# Patient Record
Sex: FEMALE | Race: WHITE | URBAN - METROPOLITAN AREA
[De-identification: names, ages, dates, MRNs, and addresses within clinical notes are randomized per-mention and may not be internally consistent; named-entity substitution may affect disease eponyms.]

---

## 2017-11-30 ENCOUNTER — OFFICE VISIT (OUTPATIENT)
Dept: FAMILY MEDICINE CLINIC | Age: 27
End: 2017-11-30

## 2017-11-30 VITALS
WEIGHT: 141 LBS | OXYGEN SATURATION: 98 % | DIASTOLIC BLOOD PRESSURE: 76 MMHG | HEART RATE: 73 BPM | SYSTOLIC BLOOD PRESSURE: 113 MMHG | RESPIRATION RATE: 16 BRPM | TEMPERATURE: 98.4 F | BODY MASS INDEX: 20.88 KG/M2 | HEIGHT: 69 IN

## 2017-11-30 DIAGNOSIS — L70.0 ACNE VULGARIS: ICD-10-CM

## 2017-11-30 DIAGNOSIS — Z01.419 WELL WOMAN EXAM WITH ROUTINE GYNECOLOGICAL EXAM: Primary | ICD-10-CM

## 2017-11-30 DIAGNOSIS — Z13.220 SCREENING FOR LIPID DISORDERS: ICD-10-CM

## 2017-11-30 DIAGNOSIS — Z28.21 REFUSED INFLUENZA VACCINE: ICD-10-CM

## 2017-11-30 DIAGNOSIS — A60.04 TYPE 2 HSV INFECTION OF VULVOVAGINAL REGION: ICD-10-CM

## 2017-11-30 DIAGNOSIS — Z87.42 HX OF ABNORMAL CERVICAL PAP SMEAR: ICD-10-CM

## 2017-11-30 DIAGNOSIS — Z72.51 HIGH RISK SEXUAL BEHAVIOR: ICD-10-CM

## 2017-11-30 NOTE — PATIENT INSTRUCTIONS

## 2017-11-30 NOTE — PROGRESS NOTES
HPI:  Rajeev Barajas is a 31 y.o. female presenting for well woman exam.  She is a new patient to our practice. [place]. She has a history of abnormal pap testing, but did have one 14 months ago that was normal.  Has had colposcopy in the past, but does not remember why or result. Also asking for referral to dermatologist today for acne--already seeing this provider, just needs a referral today for insurance purposes. 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 Age at which menses began: 13-14  Last menstrual period was 11/16/17. Length of periods: 4 days. Number of days between periods: 28  Menstrual flow: first 2 days is heavier, light on the last 2 days. G0    Sexually active?: yes  Number of sexual partners: 1 currently. Has had history of over 48 sexual partners. Type of sexual partners: boyfriend  Method of family planning: condoms    Diet: healthy. Exercise: yes. Goes to gym--elipitcal.  Walks, runs. Stays active. Allergies- reviewed:   No Known Allergies      Medications- reviewed:   No current outpatient prescriptions on file. No current facility-administered medications for this visit. Past Medical History- reviewed:  History reviewed. No pertinent past medical history. Past Surgical History- reviewed:   History reviewed. No pertinent surgical history. Family History - reviewed:  Family History   Problem Relation Age of Onset    Cancer Mother     Diabetes Father     Asthma Brother     Cancer Maternal Grandmother     Diabetes Paternal Grandfather          Social History - reviewed:  Social History     Social History    Marital status: SINGLE     Spouse name: N/A    Number of children: N/A    Years of education: N/A     Occupational History    Not on file.      Social History Main Topics    Smoking status: Former Smoker    Smokeless tobacco: Never Used    Alcohol use Yes    Drug use: No    Sexual activity: Yes Birth control/ protection: Condom     Other Topics Concern    Not on file     Social History Narrative    No narrative on file         Immunizations - reviewed: There is no immunization history on file for this patient. Flu: declines. Tdap: received 5 years ago. Health Maintenance reviewed -  Pap smear Recieves yearly because of history of abnormal findings. Not sure what these findings were. Asking for repeat pap testing today. Mammogram never received. Mom diagnosed with breast cancer in 46s. Review of Systems     Review of Systems   Constitutional: Negative for chills and fever. Respiratory: Negative for shortness of breath. Cardiovascular: Negative for chest pain. Genitourinary:        No breast masses. Physical Exam  Visit Vitals    /76    Pulse 73    Temp 98.4 °F (36.9 °C) (Oral)    Resp 16    Ht 5' 8.5\" (1.74 m)    Wt 141 lb (64 kg)    LMP 11/16/2017 (Exact Date)    SpO2 98%    BMI 21.12 kg/m2       Physical Exam   Constitutional: She is oriented to person, place, and time and well-developed, well-nourished, and in no distress. No distress. HENT:   Head: Normocephalic. Eyes: Pupils are equal, round, and reactive to light. Neck: Normal range of motion. No thyromegaly present. Cardiovascular: Normal rate, regular rhythm, normal heart sounds and intact distal pulses. Exam reveals no gallop and no friction rub. No murmur heard. Pulmonary/Chest: Effort normal and breath sounds normal. No respiratory distress. She has no wheezes. She has no rales. She exhibits no tenderness. Abdominal: Soft. Bowel sounds are normal. She exhibits no distension and no mass. There is no tenderness. There is no rebound and no guarding. Genitourinary: Vagina normal, cervix normal and vulva normal.   Cervix is not fixed. Cervix exhibits no motion tenderness, no lesion and no tenderness. Musculoskeletal: Normal range of motion.    Lymphadenopathy:     She has no cervical adenopathy. Neurological: She is alert and oriented to person, place, and time. Skin: Skin is warm and dry. Psychiatric: Affect normal.   Nursing note and vitals reviewed. Assessment/Plan:    ICD-10-CM ICD-9-CM    1. Well woman exam with routine gynecological exam Z01.419 V72.31 PAP IG, RFX APTIMA HPV ASCUS ([id number]))   2. Acne vulgaris L70.0 706.1 REFERRAL TO DERMATOLOGY   3. Screening for lipid disorders Z13.220 V77.91 LIPID PANEL   4. Refused influenza vaccine Z28.21 V64.06    5. High risk sexual behavior Z72.51 V69.2     50+ sexual partners. 6. Hx of abnormal cervical Pap smear Z87.898 V13.29    7. 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 Well woman exam with routine gynecological exam - pap today. Unsure what patient's history of abnormal pap testing is. I will request records today. Will collect a pap and then proceed base don this result. - PAP IG, RFX APTIMA HPV ASCUS ([id number]))    2. Acne vulgaris - currently following with dermatology. Requesting a referral today.  - REFERRAL TO DERMATOLOGY    3. Screening for lipid disorders - not fasting. Will go ahead and draw lipid panel today. - LIPID PANEL    4. Refused influenza vaccine - counseled. 5. High risk sexual behavior - 50+ sexual partners in her history. No longer engaging in this behavior. Has had chlamydia in the past--treated. No symptoms currently. · Counseled re: diet, exercise, healthy lifestyle    · Appropriate labs, vaccines, imaging studies, and referrals ordered as listed above    Follow-up Disposition:  Return in about 1 year (around 11/30/2018) for well woman visit. .      I have discussed the diagnosis with the patient and the intended plan as seen in the above orders. Patient verbalized understanding of the plan and agrees with the plan. The patient has received an after-visit summary and questions were answered concerning future plans.   I have discussed medication side effects and warnings with the patient as well. Informed patient to return to the office if new symptoms arise.         Myles Woods MD  Family Medicine Resident

## 2017-11-30 NOTE — PROGRESS NOTES
Chief Complaint   Patient presents with   [facility] Establish Care     1. Have you been to the ER, urgent care clinic since your last visit? Hospitalized since your last visit? No    2. Have you seen or consulted any other health care providers outside of the [address] [place] [address] since your last visit? Include any pap smears or colon screening.  No     Needs a pap today if time permits    Had one 14 months ago--last one wasn't normal    Really needs one today--    Needs labs--been over 2 years since last labs drawn

## 2017-11-30 NOTE — MR AVS SNAPSHOT
Visit Information Date & Time Provider Department Dept. Phone Encounter #  
 11/30/2017  9:15 AM Penelope Haines MD [facility] [phone] [id number] Upcoming Health Maintenance Date Due DTaP/Tdap/Td series (1 - Tdap) 12/9/2010 PAP AKA CERVICAL CYTOLOGY 12/9/2010 Influenza Age 5 to Adult 8/1/2017 Allergies as of 11/30/2017  Review Complete On: 11/30/2017 By: Hadley Lugo LPN No Known Allergies Current Immunizations  Never Reviewed No immunizations on file. Not reviewed this visit You Were Diagnosed With   
  
 Codes Comments Well woman exam with routine gynecological exam    -  Primary ICD-10-CM: R52.036 ICD-9-CM: V72.31 Acne vulgaris     ICD-10-CM: L70.0 ICD-9-CM: 706.1 Vitals BP Pulse Temp Resp Height(growth percentile) Weight(growth percentile) 113/76 73 98.4 °F (36.9 °C) (Oral) 16 5' 8.5\" (1.74 m) 141 lb (64 kg) LMP SpO2 BMI Smoking Status 11/16/2017 (Exact Date) 98% 21.12 kg/m2 Former Smoker Vitals History BMI and BSA Data Body Mass Index Body Surface Area  
 21.12 kg/m 2 1.76 m 2 Preferred Pharmacy Pharmacy Name Phone [facility] [address] [place] [address] [phone] Your Updated Medication List  
  
Notice  As of 11/30/2017 10:06 AM  
 You have not been prescribed any medications. We Performed the Following PAP IG, RFX APTIMA HPV ASCUS ([id number])) [[id number] Custom] REFERRAL TO DERMATOLOGY [REF19 Custom] Referral Information Referral ID Referred By Referred To  
  
 [id number] [facility]   
   [address] [place] Phone: [phone] Fax: [phone] Visits Status Start Date End Date 1 New Request 11/30/17 11/30/18  If your referral has a status of pending review or denied, additional information will be sent to support the outcome of this decision. Patient Instructions Well Visit, Ages 25 to 48: Care Instructions Your Care Instructions Physical exams can help you stay healthy. Your doctor has checked your overall health and may have suggested ways to take good care of yourself. He or she also may have recommended tests. At home, you can help prevent illness with healthy eating, regular exercise, and other steps. Follow-up care is a key part of your treatment and safety. Be sure to make and go to all appointments, and call your doctor if you are having problems. It's also a good idea to know your test results and keep a list of the medicines you take. How can you care for yourself at home? · Reach and stay at a healthy weight. This will lower your risk for many problems, such as obesity, diabetes, heart disease, and high blood pressure. · Get at least 30 minutes of physical activity on most days of the week. Walking is a good choice. You also may want to do other activities, such as running, swimming, cycling, or playing tennis or team sports. Discuss any changes in your exercise program with your doctor. · Do not smoke or allow others to smoke around you. If you need help quitting, talk to your doctor about stop-smoking programs and medicines. These can increase your chances of quitting for good. · Talk to your doctor about whether you have any risk factors for sexually transmitted infections (STIs). Having one sex partner (who does not have STIs and does not have sex with anyone else) is a good way to avoid these infections. · Use birth control if you do not want to have children at this time. Talk with your doctor about the choices available and what might be best for you. · Protect your skin from too much sun.  When you're outdoors from 10 a.m. to 4 p.m., stay in the shade or cover up with clothing and a hat with a wide brim. Wear sunglasses that block UV rays. Even when it's cloudy, put broad-spectrum sunscreen (SPF 30 or higher) on any exposed skin. · See a dentist one or two times a year for checkups and to have your teeth cleaned. · Wear a seat belt in the car. · Drink alcohol in moderation, if at all. That means no more than 2 drinks a day for men and 1 drink a day for women. Follow your doctor's advice about when to have certain tests. These tests can spot problems early. For everyone · Cholesterol. Have the fat (cholesterol) in your blood tested after age 21. Your doctor will tell you how often to have this done based on your age, family history, or other things that can increase your risk for heart disease. · Blood pressure. Have your blood pressure checked during a routine doctor visit. Your doctor will tell you how often to check your blood pressure based on your age, your blood pressure results, and other factors. · Vision. Talk with your doctor about how often to have a glaucoma test. 
· Diabetes. Ask your doctor whether you should have tests for diabetes. · Colon cancer. Have a test for colon cancer at age 48. You may have one of several tests. If you are younger than 48, you may need a test earlier if you have any risk factors. Risk factors include whether you already had a precancerous polyp removed from your colon or whether your parent, brother, sister, or child has had colon cancer. For women · Breast exam and mammogram. Talk to your doctor about when you should have a clinical breast exam and a mammogram. Medical experts differ on whether and how often women under 50 should have these tests. Your doctor can help you decide what is right for you. · Pap test and pelvic exam. Begin Pap tests at age 24. A Pap test is the best way to find cervical cancer.  The test often is part of a pelvic exam. Ask how often to have this test. 
 · Tests for sexually transmitted infections (STIs). Ask whether you should have tests for STIs. You may be at risk if you have sex with more than one person, especially if your partners do not wear condoms. For men · Tests for sexually transmitted infections (STIs). Ask whether you should have tests for STIs. You may be at risk if you have sex with more than one person, especially if you do not wear a condom. · Testicular cancer exam. Ask your doctor whether you should check your testicles regularly. · Prostate exam. Talk to your doctor about whether you should have a blood test (called a PSA test) for prostate cancer. Experts differ on whether and when men should have this test. Some experts suggest it if you are older than 39 and are -American or have a father or brother who got prostate cancer when he was younger than 72. When should you call for help? Watch closely for changes in your health, and be sure to contact your doctor if you have any problems or symptoms that concern you. Where can you learn more? Go to http://kingsley-shelby.info/. Enter P072 in the search box to learn more about \"Well Visit, Ages 25 to 48: Care Instructions. \" Current as of: May 12, 2017 Content Version: 11.4 © 7058-3807 [employer]wise, Incorporated. Care instructions adapted under license by [employer] (which disclaims liability or warranty for this information). If you have questions about a medical condition or this instruction, always ask your healthcare professional. [place] [address] any warranty or liability for your use of this information. Introducing [facility] [place] [facility]! Guille Benjamin introduces [employer] patient portal. Now you can access parts of your medical record, email your doctor's office, and request medication refills online. 1. In your internet browser, go to [email or web address] 2. Click on the First Time User? Click Here link in the Sign In box. You will see the New Member Sign Up page. 3. Enter your [employer] Access Code exactly as it appears below. You will not need to use this code after youve completed the sign-up process. If you do not sign up before the expiration date, you must request a new code. · [employer] Access Code: [email or web address] Expires: 2/28/2018  9:40 AM 
 
4. Enter the last four digits of your Social Security Number (xxxx) and Date of Birth (mm/dd/yyyy) as indicated and click Submit. You will be taken to the next sign-up page. 5. Create a Qu[employer]-E ID. This will be your [employer] login ID and cannot be changed, so think of one that is secure and easy to remember. 6. Create a [employer] password. You can change your password at any time. 7. Enter your Password Reset Question and Answer. This can be used at a later time if you forget your password. 8. Enter your e-mail address. You will receive e-mail notification when new information is available in [address] 9. Click Sign Up. You can now view and download portions of your medical record. 10. Click the Download Summary menu link to download a portable copy of your medical information. If you have questions, please visit the Frequently Asked Questions section of the [employer] website. Remember, [employer] is NOT to be used for urgent needs. For medical emergencies, dial 911. Now available from your iPhone and Android! Please provide this summary of care documentation to your next provider. If you have any questions after today's visit, please call [phone].

## 2017-12-01 ENCOUNTER — TELEPHONE (OUTPATIENT)
Dept: FAMILY MEDICINE CLINIC | Age: 27
End: 2017-12-01

## 2017-12-01 LAB
CHOLEST SERPL-MCNC: 131 MG/DL (ref 100–199)
HDLC SERPL-MCNC: 56 MG/DL
INTERPRETATION, 910389: NORMAL
LDLC SERPL CALC-MCNC: 63 MG/DL (ref 0–99)
TRIGL SERPL-MCNC: 59 MG/DL (ref 0–149)
VLDLC SERPL CALC-MCNC: 12 MG/DL (ref 5–40)

## 2017-12-01 NOTE — TELEPHONE ENCOUNTER
Spoke with patient to inform her to change her PCP to a physician in this office & she stated that she will call today. ... Also told her that it may take up to 3 days before it shows up on the website. ... I cannot submit referral until it shows up on the website & she is aware of this. ... Her appointment is on 12/4/17 & referral may have to be backdated. ...

## 2017-12-01 NOTE — TELEPHONE ENCOUNTER
[phone]    Patient called with this information:    appt date Monday Dec.4  @10:15 am  [phone]    8 Mattie Reyes MD 1 1      Diagnosis Information   Diagnosis   L70.0 (ICD-10-CM) - Acne vulgaris

## 2017-12-01 NOTE — TELEPHONE ENCOUNTER
Dr. Tereasa Seip  Received: Today       Geovany Magallanes [facility] Front Office                     Pt is calling regarding her referral. She has switched the name on her insurance card to Dr. Haydee Jacobson. She stated it could take up to 48 hours and she has a referral appt. on 12/4. The best contact is [phone].  Reference number is [id number].

## 2017-12-04 NOTE — TELEPHONE ENCOUNTER
Patient calling back and at appointment. Patient states referral not there and asking to speak with Miracle/referrals. Call was disconnected during hold. Orestes Stauffer is aware.

## 2017-12-07 NOTE — PROGRESS NOTES
Results reviewed. Normal lipid panel. Pap testing reviewed (see outside file from [address] [place] [address] labs scanned into [facility]). I will have a letter mailed to the patient.